# Patient Record
Sex: FEMALE | Race: WHITE | NOT HISPANIC OR LATINO | Employment: FULL TIME | URBAN - METROPOLITAN AREA
[De-identification: names, ages, dates, MRNs, and addresses within clinical notes are randomized per-mention and may not be internally consistent; named-entity substitution may affect disease eponyms.]

---

## 2022-07-19 ENCOUNTER — HOSPITAL ENCOUNTER (INPATIENT)
Facility: CLINIC | Age: 27
End: 2022-07-19
Attending: PSYCHIATRY & NEUROLOGY | Admitting: PSYCHIATRY & NEUROLOGY

## 2022-07-19 ENCOUNTER — HOSPITAL ENCOUNTER (EMERGENCY)
Facility: CLINIC | Age: 27
Discharge: HOME | End: 2022-07-19
Attending: EMERGENCY MEDICINE | Admitting: EMERGENCY MEDICINE
Payer: COMMERCIAL

## 2022-07-19 ENCOUNTER — TELEPHONE (OUTPATIENT)
Dept: BEHAVIORAL HEALTH | Facility: CLINIC | Age: 27
End: 2022-07-19

## 2022-07-19 VITALS
HEART RATE: 98 BPM | DIASTOLIC BLOOD PRESSURE: 80 MMHG | RESPIRATION RATE: 18 BRPM | TEMPERATURE: 99.3 F | SYSTOLIC BLOOD PRESSURE: 124 MMHG | WEIGHT: 177.8 LBS | OXYGEN SATURATION: 99 %

## 2022-07-19 DIAGNOSIS — F41.9 ANXIETY: ICD-10-CM

## 2022-07-19 DIAGNOSIS — R94.31 PROLONGED Q-T INTERVAL ON ECG: ICD-10-CM

## 2022-07-19 DIAGNOSIS — R55 NEAR SYNCOPE: ICD-10-CM

## 2022-07-19 LAB
ANION GAP SERPL CALCULATED.3IONS-SCNC: 10 MMOL/L (ref 3–14)
ANION GAP SERPL CALCULATED.3IONS-SCNC: 9 MMOL/L (ref 3–14)
ATRIAL RATE - MUSE: 97 BPM
BASOPHILS # BLD AUTO: 0 10E3/UL (ref 0–0.2)
BASOPHILS NFR BLD AUTO: 0 %
BUN SERPL-MCNC: 5 MG/DL (ref 7–30)
BUN SERPL-MCNC: 5 MG/DL (ref 7–30)
CALCIUM SERPL-MCNC: 8.3 MG/DL (ref 8.5–10.1)
CALCIUM SERPL-MCNC: 8.7 MG/DL (ref 8.5–10.1)
CHLORIDE BLD-SCNC: 109 MMOL/L (ref 94–109)
CHLORIDE BLD-SCNC: 111 MMOL/L (ref 94–109)
CO2 SERPL-SCNC: 19 MMOL/L (ref 20–32)
CO2 SERPL-SCNC: 22 MMOL/L (ref 20–32)
CREAT SERPL-MCNC: 0.54 MG/DL (ref 0.52–1.04)
CREAT SERPL-MCNC: 0.57 MG/DL (ref 0.52–1.04)
DIASTOLIC BLOOD PRESSURE - MUSE: NORMAL MMHG
EOSINOPHIL # BLD AUTO: 0 10E3/UL (ref 0–0.7)
EOSINOPHIL NFR BLD AUTO: 0 %
ERYTHROCYTE [DISTWIDTH] IN BLOOD BY AUTOMATED COUNT: 12.4 % (ref 10–15)
ETHANOL SERPL-MCNC: 0.16 G/DL
GFR SERPL CREATININE-BSD FRML MDRD: >90 ML/MIN/1.73M2
GFR SERPL CREATININE-BSD FRML MDRD: >90 ML/MIN/1.73M2
GLUCOSE BLD-MCNC: 104 MG/DL (ref 70–99)
GLUCOSE BLD-MCNC: 132 MG/DL (ref 70–99)
GLUCOSE BLDC GLUCOMTR-MCNC: 140 MG/DL (ref 70–99)
HCG SERPL QL: NEGATIVE
HCT VFR BLD AUTO: 47.7 % (ref 35–47)
HGB BLD-MCNC: 15.4 G/DL (ref 11.7–15.7)
HOLD SPECIMEN: NORMAL
HOLD SPECIMEN: NORMAL
IMM GRANULOCYTES # BLD: 0 10E3/UL
IMM GRANULOCYTES NFR BLD: 0 %
INTERPRETATION ECG - MUSE: NORMAL
LYMPHOCYTES # BLD AUTO: 1.5 10E3/UL (ref 0.8–5.3)
LYMPHOCYTES NFR BLD AUTO: 33 %
MAGNESIUM SERPL-MCNC: 2.3 MG/DL (ref 1.6–2.3)
MCH RBC QN AUTO: 31.2 PG (ref 26.5–33)
MCHC RBC AUTO-ENTMCNC: 32.3 G/DL (ref 31.5–36.5)
MCV RBC AUTO: 97 FL (ref 78–100)
MONOCYTES # BLD AUTO: 0.3 10E3/UL (ref 0–1.3)
MONOCYTES NFR BLD AUTO: 7 %
NEUTROPHILS # BLD AUTO: 2.7 10E3/UL (ref 1.6–8.3)
NEUTROPHILS NFR BLD AUTO: 60 %
NRBC # BLD AUTO: 0 10E3/UL
NRBC BLD AUTO-RTO: 0 /100
P AXIS - MUSE: 46 DEGREES
PLATELET # BLD AUTO: 208 10E3/UL (ref 150–450)
POTASSIUM BLD-SCNC: 4 MMOL/L (ref 3.4–5.3)
POTASSIUM BLD-SCNC: 4.1 MMOL/L (ref 3.4–5.3)
PR INTERVAL - MUSE: 134 MS
QRS DURATION - MUSE: 92 MS
QT - MUSE: 378 MS
QTC - MUSE: 480 MS
R AXIS - MUSE: 49 DEGREES
RBC # BLD AUTO: 4.94 10E6/UL (ref 3.8–5.2)
SARS-COV-2 RNA RESP QL NAA+PROBE: NEGATIVE
SODIUM SERPL-SCNC: 140 MMOL/L (ref 133–144)
SODIUM SERPL-SCNC: 140 MMOL/L (ref 133–144)
SYSTOLIC BLOOD PRESSURE - MUSE: NORMAL MMHG
T AXIS - MUSE: 46 DEGREES
VENTRICULAR RATE- MUSE: 97 BPM
WBC # BLD AUTO: 4.5 10E3/UL (ref 4–11)

## 2022-07-19 PROCEDURE — 250N000013 HC RX MED GY IP 250 OP 250 PS 637

## 2022-07-19 PROCEDURE — 36415 COLL VENOUS BLD VENIPUNCTURE: CPT | Performed by: EMERGENCY MEDICINE

## 2022-07-19 PROCEDURE — 83735 ASSAY OF MAGNESIUM: CPT | Performed by: EMERGENCY MEDICINE

## 2022-07-19 PROCEDURE — 84703 CHORIONIC GONADOTROPIN ASSAY: CPT | Performed by: EMERGENCY MEDICINE

## 2022-07-19 PROCEDURE — 250N000013 HC RX MED GY IP 250 OP 250 PS 637: Performed by: EMERGENCY MEDICINE

## 2022-07-19 PROCEDURE — 80048 BASIC METABOLIC PNL TOTAL CA: CPT | Performed by: EMERGENCY MEDICINE

## 2022-07-19 PROCEDURE — 90791 PSYCH DIAGNOSTIC EVALUATION: CPT

## 2022-07-19 PROCEDURE — C9803 HOPD COVID-19 SPEC COLLECT: HCPCS

## 2022-07-19 PROCEDURE — 85004 AUTOMATED DIFF WBC COUNT: CPT | Performed by: EMERGENCY MEDICINE

## 2022-07-19 PROCEDURE — 82077 ASSAY SPEC XCP UR&BREATH IA: CPT | Performed by: EMERGENCY MEDICINE

## 2022-07-19 PROCEDURE — 96361 HYDRATE IV INFUSION ADD-ON: CPT

## 2022-07-19 PROCEDURE — 258N000003 HC RX IP 258 OP 636: Performed by: EMERGENCY MEDICINE

## 2022-07-19 PROCEDURE — 93005 ELECTROCARDIOGRAM TRACING: CPT

## 2022-07-19 PROCEDURE — U0003 INFECTIOUS AGENT DETECTION BY NUCLEIC ACID (DNA OR RNA); SEVERE ACUTE RESPIRATORY SYNDROME CORONAVIRUS 2 (SARS-COV-2) (CORONAVIRUS DISEASE [COVID-19]), AMPLIFIED PROBE TECHNIQUE, MAKING USE OF HIGH THROUGHPUT TECHNOLOGIES AS DESCRIBED BY CMS-2020-01-R: HCPCS | Performed by: EMERGENCY MEDICINE

## 2022-07-19 PROCEDURE — 99285 EMERGENCY DEPT VISIT HI MDM: CPT | Mod: CS,25

## 2022-07-19 PROCEDURE — 96360 HYDRATION IV INFUSION INIT: CPT

## 2022-07-19 RX ORDER — HYDROXYZINE HYDROCHLORIDE 25 MG/1
50 TABLET, FILM COATED ORAL ONCE
Status: COMPLETED | OUTPATIENT
Start: 2022-07-19 | End: 2022-07-19

## 2022-07-19 RX ORDER — OLANZAPINE 10 MG/1
10 TABLET ORAL ONCE
Status: DISCONTINUED | OUTPATIENT
Start: 2022-07-19 | End: 2022-07-19 | Stop reason: HOSPADM

## 2022-07-19 RX ORDER — OLANZAPINE 5 MG/1
TABLET, ORALLY DISINTEGRATING ORAL
Status: COMPLETED
Start: 2022-07-19 | End: 2022-07-19

## 2022-07-19 RX ORDER — ACETAMINOPHEN 325 MG/1
650 TABLET ORAL EVERY 8 HOURS PRN
Status: DISCONTINUED | OUTPATIENT
Start: 2022-07-19 | End: 2022-07-19 | Stop reason: HOSPADM

## 2022-07-19 RX ORDER — LORAZEPAM 1 MG/1
1 TABLET ORAL ONCE
Status: COMPLETED | OUTPATIENT
Start: 2022-07-19 | End: 2022-07-19

## 2022-07-19 RX ADMIN — HYDROXYZINE HYDROCHLORIDE 50 MG: 25 TABLET, FILM COATED ORAL at 11:33

## 2022-07-19 RX ADMIN — SODIUM CHLORIDE 1000 ML: 9 INJECTION, SOLUTION INTRAVENOUS at 17:00

## 2022-07-19 RX ADMIN — OLANZAPINE 10 MG: 5 TABLET, ORALLY DISINTEGRATING ORAL at 12:08

## 2022-07-19 RX ADMIN — LORAZEPAM 1 MG: 1 TABLET ORAL at 18:04

## 2022-07-19 ASSESSMENT — ENCOUNTER SYMPTOMS
ABDOMINAL PAIN: 0
DIZZINESS: 1
SHORTNESS OF BREATH: 1
PALPITATIONS: 1

## 2022-07-19 NOTE — DISCHARGE INSTRUCTIONS
*You may resume diet and activities. Drink plenty of fluids.  *No new medications.  Continue your current medications  *Followup with your doctor for a recheck in the next few days.  *Return if you develop feelings of wanting to harm yourself or others or become worse in any way.

## 2022-07-19 NOTE — ED NOTES
Pt came out and stated she needs something to sedate her or she'll bang her head on the wall. Provider aware.

## 2022-07-19 NOTE — ED PROVIDER NOTES
History   Chief Complaint:  Panic Attack       HPI   Roula Goel is a 27 year old female with what she feels like panic attacks that have been worse since last Thursday.  Patient reports she has been drinking alcohol over the weekend in an attempt to self medicate.  She does drink every day since Thursday and was drinking some wine prior to coming to the ED today.  She reports sensation of increased stress, hot flashes, palpitations and shortness of breath that have been coming and going since Thursday.  She does feel like her stress levels are high and she has been having increased thoughts of suicide but has not formulated a specific plan.  In particular she says she has thought about ways in which she would not kill herself and ways in which she would want to avoid.  She is never attempted suicide in the past.  Not currently taking any medications and denies prior medical problems.  She is never experienced alcohol withdrawal symptoms, prior to last Thursday says she typically drinks 3 to 5 days in a typical week.    Review of Systems   Respiratory: Positive for shortness of breath.    Cardiovascular: Positive for palpitations. Negative for chest pain.   Gastrointestinal: Negative for abdominal pain.   All other systems reviewed and are negative.        Allergies:  The patient has no known allergies.     Medications:  None    Past Medical History:     No reported past medical history.      Social History:  Patient is unaccompanied.   Patient arrived via a private vehicle.     Physical Exam     Patient Vitals for the past 24 hrs:   BP Temp Temp src Pulse Resp SpO2 Weight   07/19/22 1209 122/76 -- -- 109 20 97 % --   07/19/22 1024 (!) 139/97 99.3  F (37.4  C) Oral 118 18 91 % 80.6 kg (177 lb 12.8 oz)       Physical Exam  Gen: well appearing, in no acute distress  Oral : Mucous membranes moist,   Nose: No rhinorhea  Ears: External near normal, without drainage  Eyes: periorbital tissues and sclera normal    Neck: supple, no abnormal swelling  Lungs: Clear bilaterally, no tachypnea or distress, speaks full sentences  CV: Regular rate, regular rhythm  Abd: soft, nontender, nondistended, no rebound/guarding  Ext: no lower extremity edema  Skin: warm, dry, well perfused, no rashes/bruising/lesions on exposed skin  Neuro: alert, no gross motor or sensory deficits,   Psych: pleasant mood, normal affect      Emergency Department Course   ECG  ECG (11:03:17):  Indication: Screening for cardiovascular disease.   Rate 97 bpm. CO interval 134. QRS duration 92. QT/QTc 378/480. P-R-T axes 46 49 46.   Interpretation:    Normal Sinus Rhythm.   Nonspecific T changes.   Abnormal ECG.  Agree with computer interpretation.    Interpreted at 1230 by myself.     Laboratory:  Labs Ordered and Resulted from Time of ED Arrival to Time of ED Departure   BASIC METABOLIC PANEL - Abnormal       Result Value    Sodium 140      Potassium 4.1      Chloride 111 (*)     Carbon Dioxide (CO2) 19 (*)     Anion Gap 10      Urea Nitrogen 5 (*)     Creatinine 0.54      Calcium 8.3 (*)     Glucose 104 (*)     GFR Estimate >90     ETHYL ALCOHOL LEVEL - Abnormal    Alcohol ethyl 0.16 (*)    CBC WITH PLATELETS AND DIFFERENTIAL - Abnormal    WBC Count 4.5      RBC Count 4.94      Hemoglobin 15.4      Hematocrit 47.7 (*)     MCV 97      MCH 31.2      MCHC 32.3      RDW 12.4      Platelet Count 208      % Neutrophils 60      % Lymphocytes 33      % Monocytes 7      % Eosinophils 0      % Basophils 0      % Immature Granulocytes 0      NRBCs per 100 WBC 0      Absolute Neutrophils 2.7      Absolute Lymphocytes 1.5      Absolute Monocytes 0.3      Absolute Eosinophils 0.0      Absolute Basophils 0.0      Absolute Immature Granulocytes 0.0      Absolute NRBCs 0.0     HCG QUALITATIVE PREGNANCY - Normal    hCG Serum Qualitative Negative     COVID-19 VIRUS (CORONAVIRUS) BY PCR      Emergency Department Course:    Mental Health Risk Assessment      PSS-3    Date and  Time Over the past 2 weeks have you felt down, depressed, or hopeless? Over the past 2 weeks have you had thoughts of killing yourself? Have you ever attempted to kill yourself? When did this last happen? User   07/19/22 1022 yes yes no -- [facility]      C-SSRS ([place])    Date and Time Q1 Wished to be Dead (Past Month) Q2 Suicidal Thoughts (Past Month) Q3 Suicidal Thought Method Q4 Suicidal Intent without Specific Plan Q5 Suicide Intent with Specific Plan Q6 Suicide Behavior (Lifetime) Within the Past 3 Months? RETIRED: Level of Risk per Screen Screening Not Complete User   07/19/22 1022 yes yes no no no no -- -- -- [facility]              Suicide assessment completed by mental health (D.E.C., LCSW, etc.)    Reviewed:  I reviewed nursing notes, vitals and past medical history    Assessments:  1039 I obtained history and examined the patient as noted above.     Consults:  1237 I spoke to DEC who supports a voluntary admission for the patient as she has increased passive suicidal thoughts.     Interventions:  1133 hydrOXYzine (ATARAX) tablet 50 mg  1208 OLANZapine zydis (zyPREXA) 5 MG ODT tab    Disposition:  Care of the patient was transferred to my colleague pending mental health admission.     Impression & Plan     Medical Decision Making:  Roula Goel is a 27 year old female who presents to the ED with increased suicidal thoughts. They are passive in nature, she has not formulated any plan, but she is unable to contract her safety and does not feel safe at home. I am not seeing any evidence of alcohol withdrawal syndrome at this time. I feel she is medically appropriate for inpatient psychiatric management. DEC is willing to work on getting her placed. She is truly voluntary at this point so if she were to sober up and change her mind I think outpatient discharge management would be appropriate at this point unless something changes.      Diagnosis:    ICD-10-CM    1. Suicidal ideation  R45.851      Saul  Disclosure:  I, Noe Alejandro, am serving as a scribe at 10:36 AM on 7/19/2022 to document services personally performed by Fabricio Gonzalez MD based on my observations and the provider's statements to me.        Fabricio Gonzalez MD  07/19/22 4083

## 2022-07-19 NOTE — CONSULTS
"Diagnostic Evaluation Consultation  Crisis Assessment    Patient was assessed: remote  Patient location: [facility]  Was a release of information signed: No. Reason: declined      Referral Data and Chief Complaint  Patient  is a 27 year old, who uses she/her pronouns, and presents to the ED alone. Patient is referred to the ED by self. Patient is presenting to the ED for the following concerns: worsening panic episodes, self-medicating with ETOH, and suicidal ideations.      Informed Consent and Assessment Methods     Patient is her own guardian. Writer met with patient and explained the crisis assessment process, including applicable information disclosures and limits to confidentiality, assessed understanding of the process, and obtained consent to proceed with the assessment. Patient was observed to be able to participate in the assessment as evidenced by her ability to speak. Assessment methods included conducting a formal interview with patient, review of medical records, collaboration with medical staff, and obtaining relevant collateral information from family and community providers when available..     Over the course of this crisis assessment provided reassurance and offered validation. Patient's response to interventions was positive     Summary of Patient Situation    Patient states she has been struggling with severe panic episodes since last Friday for unknown reasons. She has attempted to reduce her sx through many avenues, and admits that she has been consuming ETOH in an effort to self-medicate during this time. Patient states that she is also fatigues and suicidal, and if discharged \"I will do something bad to myself right away.\" Patient is threatening to hit her head on the wall presently in the ED. Sx include shortness of breath, chest pain, increased heart rate, and overall tension.    Brief Psychosocial History    Patient lives alone and declined to offer information for this portion due to fatigue " and tearfulness.     Significant Clinical History    Patient shares that she has struggled with anxiety since the age of 19 years old and denies having any prior treatment related to mental or chemical health. She currently does not have any mental health supports or medication in place.      Collateral Information    None available at this time.      Risk Assessment  ESS-6  1.a. Over the past 2 weeks, have you had thoughts of killing yourself? Yes  1.b. Have you ever attempted to kill yourself and, if yes, when did this last happen? No   2. Recent or current suicide plan? Yes will not disclose   3. Recent or current intent to act on ideation? Yes  4. Lifetime psychiatric hospitalization? No  5. Pattern of excessive substance use? Yes  6. Current irritability, agitation, or aggression? Yes  Scoring note: BOTH 1a and 1b must be yes for it to score 1 point, if both are not yes it is zero. All others are 1 point per number. If all questions 1a/1b - 6 are no, risk is negligible. If one of 1a/1b is yes, then risk is mild. If either question 2 or 3, but not both, is yes, then risk is automatically moderate regardless of total score. If both 2 and 3 are yes, risk is automatically high regardless of total score.      Score: 4, high risk      Does the patient have access to lethal means? No     Does the patient engage in non-suicidal self-injurious behavior (NSSI/SIB)? no     Does the patient have thoughts of harming others? No     Is the patient engaging in sexually inappropriate behavior?  no        Current Substance Abuse     Is there recent substance abuse? Patient admits that she has been abusing ETOH since last Friday in an effort to self-medicate, buy declines to offer any further infomation about her CD history, if any, at this time.      Was a urine drug screen or blood alcohol level obtained: Yes .16       Mental Status Exam     Affect: Blunted   Appearance: Disheveled    Attention Span/Concentration:  Attentive  Eye Contact: Variable   Fund of Knowledge: Appropriate    Language /Speech Content: Fluent   Language /Speech Volume: Soft    Language /Speech Rate/Productions: Pressured    Recent Memory: Variable   Remote Memory: Variable   Mood: Depressed and Sad    Orientation to Person: Yes    Orientation to Place: Yes   Orientation to Time of Day: Yes    Orientation to Date: Yes    Situation (Do they understand why they are here?): Yes    Psychomotor Behavior: Underactive    Thought Content: Suicidal   Thought Form: Goal Directed      History of commitment: No    Medication    Psychotropic medications: No  Medication changes made in the last two weeks: No       Current Care Team    Primary Care Provider: No  Psychiatrist: No  Therapist: No  : No     CTSS or ARMHS: No  ACT Team: No  Other: No      Diagnosis    296.33 (F33.2) Major Depressive Disorder, Recurrent Episode, Severe _ and With anxious distress   300.01 (F41.0) Panic Disorder - primary   Substance-Related & Addictive Disorders Alcohol Use Disorder   303.90 (F10.20) Moderate In a controlled environment - by history       Clinical Summary and Substantiation of Recommendations    Patient is depleted and distraught due to recurring and unmanageable panic episodes that are also escalating her depression and suicidal ideations/plan/intent. Patient cannot contract for safety at this time and is threatening to hit her head in the ED presently, as well as complete suicide if discharged. Patient offers high risk overall with minimal ability to cope at this time.      Disposition    Recommended disposition: Inpatient Mental Health       Reviewed case and recommendations with attending provider. Attending Name: Dr. Gonzalez       Attending concurs with disposition: Yes       Patient concurs with disposition: Yes       Guardian concurs with disposition: NA      Final disposition: Inpatient mental health .     Inpatient Details (if applicable):  Is patient  admitted voluntarily:Yes      Patient aware of potential for transfer if there is not appropriate placement? Yes       Patient is willing to travel outside of the [facility] for placement? Yes      Behavioral Intake Notified? Yes: Date: 7/19/22 Time: 1pm.     Assessment Details    Patient interview started at: 12:00pm and completed at: 1:00pm.     Total duration spent on the patient case in minutes: 2.0 hrs      CPT code(s) utilized: 19211 - Psychotherapy for Crisis - 60 (30-74*) min       Timmy Xie, LICSW, MSW, LICSW  [facility] - Triage & Transition Services

## 2022-07-19 NOTE — ED NOTES
Roula Goel  July 19, 2022  SAFE Note    Critical Safety Issues: overwhelming anxiety      Current Suicidal Ideation/Self-Injurious Concerns/Methods: Other states she is suicidal, does not offer specific, threating to bang head in ED      Current or Historical Inappropriate Sexual Behavior: No      Current or Historical Aggression/Homicidal Ideation: None - N/A      Triggers: unknown    Guardianship Status: is her own guardian.. Guardianship paperwork is not required.    This patient is a child/adolescent: No    This patient has additional special visitor precautions: No    Updated care team: Yes:     For additional details see full LMHP assessment.       Timmy Xie, ANGELSW

## 2022-07-19 NOTE — ED NOTES
Pt stated feeling better after talking to DEC, meds with minimal relief. Writer offered pencil and journal, pt accepted.

## 2022-07-19 NOTE — ED NOTES
Bed: ED07  Expected date: 7/19/22  Expected time: 10:26 AM  Means of arrival:   Comments:  Hold for triage

## 2022-07-19 NOTE — ED PROVIDER NOTES
I received patient in signout from Dr. Gonzalez.  Please refer to their complete H&P for further information.  Briefly, patient presented with anxiety and panic attack symptoms.  She wished to be admitted for further treatment.  This was a voluntary admission.  At time of signout, bed placement was pending.     The patient told the nurse that she wished to be discharged.  I reevaluated the patient.  She is able to contract for safety.  She states her parents will pick her up and she will spend time with her friend who helps to calm her.  She agreed to return if she starts to feel suicidal.  She agreed she would not harm herself.  She has no firearms in the house.    Patient will be discharged pending the arrival of her parents.     Loreto Garcia MD  07/19/22 [id number]

## 2022-07-19 NOTE — TELEPHONE ENCOUNTER
S: 12:38pm Aashish w/ DEC called Intake w/ clinical on a 27/F present in the [facility] w/ SI.    B:  The pt is currently at the [facility] presenting w/ persistent and invasive panic episodes since last Friday. Pt admits to an attempt to self medicate with alcohol to eliminate symptoms of anxiety. Pt reports being fatigued due to her anxiety and not being able to contract for safety. BA: .16. Pt presents as lucid, oriented. not seeking detox. Denies: HI, aggression, AH/VH, and SIB.    Guardian: Self     The pts MH Hx is as follows: Anxiety     The pt denies using any substances.- Social drinking in the past, nothing chronic.     The pt has not been IP for MH before.    The pt is Rx MH medications: None.     OP Services: None    There is no concern for aggression this visit. There is no concern for HI.     Per  the pt can ambulate independently.     Per  the pt is indep with ADLs.    The pt does not have any known medical concerns.     Covid needs to be collected.  Utox needs to be collected.  Pregnancy negative    A: Vol -[facility] Only    R: The pt is currently in the [facility] ER awaiting bed placement.    12:40pm Pt has been added to the work-list. Intake waiting labs for bed placement. Intake working on identifying appropriate bed placement.

## 2022-07-19 NOTE — ED NOTES
"RN entered room to discharge pt as pt ride was in lobby waiting. When RN entered room pt was sitting on floor in an upright position diaphoretic and pale. An ERT was in the room minutes prior as pt had requested water, pt was ambulatory at that time. RN asked pt if she fell, pt stated that she felt like her leg \"fell asleep\" and that she felt \"faint.\" Pt denies LOC and denies hitting head. Pt stated she lowered herself to the ground. MD and additional RN called to bedside to assist. Pt returned to bed, VS reimplemented, IV started, additional labs drawn, fluid bolus started, repeat EKG and BG obtained.  "

## 2022-07-19 NOTE — ED PROVIDER NOTES
History   Chief Complaint:  Panic Attack       HPI   Roula Goel is a 27 year old female with what she feels like panic attacks that have been worse since last Thursday.  Patient reports she has been drinking alcohol over the weekend in an attempt to self medicate.  She does drink every day since Thursday and was drinking some wine prior to coming to the ED today, arriving approximately 7 hours prior .  She had reported a sensation of increased stress, hot flashes, palpitations and shortness of breath that have been coming and going since Thursday.  She has never experienced alcohol withdrawal symptoms, prior to last Thursday says she typically drinks 3 to 5 days in a typical week.  The patient had received Zyprexa 10 mg ODT at 1208.  She also received hydroxyzine 50 mg p.o. at 1133.  She was going to be voluntarily admitted to the hospital for inpatient psychiatry as she had persistent anxiety however she became bored and on reassessment stated that she no longer felt suicidal and was able to contract for safety.  Her plan was to go home with her friend who took the day off and is willing to stay with her.  She had declined any food or drink while in the emergency department.  She got up and felt like her leg had fallen asleep.  She felt generally weak and she lowered her self to the ground but spilled her drink.  Our nurse found her this way.  She felt a little bit shaky but denied any other symptoms.  No new injuries.  No chest pain.  No shortness of breath.    In the process of leaving, she became dizzy and lowered herself to the ground. She did not hit her head.    Review of Systems   Respiratory: Positive for shortness of breath.    Cardiovascular: Positive for palpitations.   Neurological: Positive for dizziness.   All other systems reviewed and are negative.      Allergies:  The patient has no known allergies.     Medications:  The patient is not currently taking any prescribed medications.    Past  Medical History:     Panic attack  JAIMIE  Bulimia nervosa    Surgical History:  ORIF tibial plateau fracture, left    Social History:  The patient presents to the ED alone. Friend at bedside.    Physical Exam     Patient Vitals for the past 24 hrs:   BP Temp Temp src Pulse Resp SpO2 Weight   07/19/22 1830 124/80 -- -- 98 18 99 % --   07/19/22 1815 124/78 -- -- 105 12 -- --   07/19/22 1745 126/87 -- -- 101 18 100 % --   07/19/22 1730 128/86 -- -- 112 15 98 % --   07/19/22 1700 127/89 -- -- 97 13 98 % --   07/19/22 1650 124/87 -- -- 88 19 95 % --   07/19/22 1209 122/76 -- -- 109 20 97 % --   07/19/22 1024 (!) 139/97 99.3  F (37.4  C) Oral 118 18 91 % 80.6 kg (177 lb 12.8 oz)       Physical Exam  General: Well-nourished, pale on reassessment  Eyes: PERRL, conjunctivae pink no scleral icterus or conjunctival injection  ENT:  Moist mucus membranes, posterior oropharynx clear without erythema or exudates  Respiratory:  Lungs clear to auscultation bilaterally, no crackles/rubs/wheezes.  Good air movement  CV: Normal rate and rhythm, no murmurs/rubs/gallops  GI:  Abdomen soft and non-distended.  Normoactive BS.  No tenderness, guarding or rebound  Skin: Warm, dry.  No rashes or petechiae  Musculoskeletal: No peripheral edema or calf tenderness  Neuro: Alert and oriented to person/place/time.  No significant tremulousness.  No tongue fasciculations.  Psychiatric: Normal affect    Emergency Department Course   ECG  ECG taken at 1646, ECG read at 1646  Sinus rhythm with frequent premature ventricular complexes. Prolonged QT. Abnormal ECG.   Rate 88 bpm. AK interval 132 ms. QRS duration 94 ms. QT/QTc 414/500 ms. P-R-T axes 54 62 44.     Laboratory:  Labs Ordered and Resulted from Time of ED Arrival to Time of ED Departure   BASIC METABOLIC PANEL - Abnormal       Result Value    Sodium 140      Potassium 4.1      Chloride 111 (*)     Carbon Dioxide (CO2) 19 (*)     Anion Gap 10      Urea Nitrogen 5 (*)     Creatinine 0.54       Calcium 8.3 (*)     Glucose 104 (*)     GFR Estimate >90     ETHYL ALCOHOL LEVEL - Abnormal    Alcohol ethyl 0.16 (*)    CBC WITH PLATELETS AND DIFFERENTIAL - Abnormal    WBC Count 4.5      RBC Count 4.94      Hemoglobin 15.4      Hematocrit 47.7 (*)     MCV 97      MCH 31.2      MCHC 32.3      RDW 12.4      Platelet Count 208      % Neutrophils 60      % Lymphocytes 33      % Monocytes 7      % Eosinophils 0      % Basophils 0      % Immature Granulocytes 0      NRBCs per 100 WBC 0      Absolute Neutrophils 2.7      Absolute Lymphocytes 1.5      Absolute Monocytes 0.3      Absolute Eosinophils 0.0      Absolute Basophils 0.0      Absolute Immature Granulocytes 0.0      Absolute NRBCs 0.0     BASIC METABOLIC PANEL - Abnormal    Sodium 140      Potassium 4.0      Chloride 109      Carbon Dioxide (CO2) 22      Anion Gap 9      Urea Nitrogen 5 (*)     Creatinine 0.57      Calcium 8.7      Glucose 132 (*)     GFR Estimate >90     GLUCOSE BY METER - Abnormal    GLUCOSE BY METER POCT 140 (*)    HCG QUALITATIVE PREGNANCY - Normal    hCG Serum Qualitative Negative     COVID-19 VIRUS (CORONAVIRUS) BY PCR - Normal    SARS CoV2 PCR Negative     MAGNESIUM - Normal    Magnesium 2.3          Emergency Department Course:    Mental Health Risk Assessment      PSS-3    Date and Time Over the past 2 weeks have you felt down, depressed, or hopeless? Over the past 2 weeks have you had thoughts of killing yourself? Have you ever attempted to kill yourself? When did this last happen? User   07/19/22 1022 yes yes no -- [facility]      C-SSRS ([place])    Date and Time Q1 Wished to be Dead (Past Month) Q2 Suicidal Thoughts (Past Month) Q3 Suicidal Thought Method Q4 Suicidal Intent without Specific Plan Q5 Suicide Intent with Specific Plan Q6 Suicide Behavior (Lifetime) Within the Past 3 Months? RETIRED: Level of Risk per Screen Screening Not Complete User   07/19/22 1022 yes yes no no no no -- -- -- [facility]              Suicide assessment completed  by mental health ([facility] LCSW, etc.)    Reviewed:  I reviewed nursing notes, vitals, past medical history and Care Everywhere    Assessments:  1645 I obtained history and examined the patient as noted above.   1802 I rechecked the patient.  I rechecked the patient and explained findings.     Interventions:  1133 Atarax 50 mg PO  1208 Zyprexa 10 mg PO  1700 NS bolus 1L IV  1804 Ativan 1 mg PO    Disposition:  The patient was discharged to home.     Impression & Plan     Medical Decision Making:  Roula Goel is a 27 year old female with a history of panic attacks and generalized anxiety disorder who presented earlier on the previous shift for concern of anxiety and alcohol intoxication.  The plan was for voluntary admission to psychiatry after the patient was medically cleared.  She subsequently stated that she did not wish to stay.  She was able to contract for safety and a safety plan was put in place.  DEC was notified and confirmed that they will follow-up with her via telephone tomorrow.  The patient stood and felt very weak in the legs.  She lowered her self to the ground.  She did not hit her head or pass out.  She did look pale.  An EKG was obtained and showed that she has PVCs and at this time her QTc is slightly prolonged.  Repeat laboratory studies were obtained.  Potassium and magnesium are both normal.  She does not appear to be in alcohol withdrawal at this time.  She had orthostatics taken and these were grossly positive with her heart rate increasing from 94 while lying to 128 while standing.  She was treated with a liter of IV fluids.  She was given a small dose of Ativan for her anxiety per her request.  Her friend did show up in the emergency department and confirmed that she noble willing to watch over her and took off the day tomorrow to be able to keep her safe.  They will return if any worsening.  The patient was subsequently able to ambulate and had no recurrent lightheadedness and  felt well and requested discharge. At this point, with reasonable clinical confidence, I do believe she safe for discharge.    Diagnosis:    ICD-10-CM    1. Anxiety  F41.9    2. Near syncope  R55    3. Prolonged Q-T interval on ECG  R94.31     seen on EKG 7/19/222       Scribe Disclosure:  I, Casandra Cui, am serving as a scribe at 4:57 PM on 7/19/2022 to document services personally performed by Loreto Garcia MD based on my observations and the provider's statements to me.              Loreto Garcia MD  07/19/22 9140

## 2022-07-19 NOTE — ED TRIAGE NOTES
Patient states she has been having anxiety and panic attacks since Friday. Patient has been drinking since Friday as well with her last drink being 20mins prior to arrival. Patient states she has had thoughts of killing herself but more to stop the pain she is feeling not because she wants to die, which is why patient states she has been drinking. Patient live alone but states she has a support system.      Triage Assessment     Row Name 07/19/22 1022       Triage Assessment (Adult)    Airway WDL WDL       Respiratory WDL    Respiratory WDL WDL       Skin Circulation/Temperature WDL    Skin Circulation/Temperature WDL WDL       Cardiac WDL    Cardiac WDL WDL       Peripheral/Neurovascular WDL    Peripheral Neurovascular WDL WDL       Cognitive/Neuro/Behavioral WDL    Cognitive/Neuro/Behavioral WDL WDL

## 2022-07-19 NOTE — TELEPHONE ENCOUNTER
Potential bed avail on 20   535pm - [facility], on call resident, paged for review   554pm - [facility] accepts     R: 20/[place]     Pt placed   603pm - unit charge notified, 730p for report   603pm - ED notified

## 2022-07-20 LAB
ATRIAL RATE - MUSE: 88 BPM
DIASTOLIC BLOOD PRESSURE - MUSE: NORMAL MMHG
INTERPRETATION ECG - MUSE: NORMAL
P AXIS - MUSE: 54 DEGREES
PR INTERVAL - MUSE: 132 MS
QRS DURATION - MUSE: 94 MS
QT - MUSE: 414 MS
QTC - MUSE: 500 MS
R AXIS - MUSE: 62 DEGREES
SYSTOLIC BLOOD PRESSURE - MUSE: NORMAL MMHG
T AXIS - MUSE: 44 DEGREES
VENTRICULAR RATE- MUSE: 88 BPM